# Patient Record
Sex: FEMALE | Race: BLACK OR AFRICAN AMERICAN | Employment: UNEMPLOYED | URBAN - METROPOLITAN AREA
[De-identification: names, ages, dates, MRNs, and addresses within clinical notes are randomized per-mention and may not be internally consistent; named-entity substitution may affect disease eponyms.]

---

## 2021-04-26 ENCOUNTER — HOSPITAL ENCOUNTER (EMERGENCY)
Age: 21
Discharge: HOME | End: 2021-04-26
Attending: EMERGENCY MEDICINE
Payer: MEDICARE

## 2021-04-26 VITALS
OXYGEN SATURATION: 96 % | TEMPERATURE: 97.7 F | SYSTOLIC BLOOD PRESSURE: 155 MMHG | DIASTOLIC BLOOD PRESSURE: 81 MMHG | RESPIRATION RATE: 18 BRPM | HEART RATE: 97 BPM

## 2021-04-26 DIAGNOSIS — H11.32 SUBCONJUNCTIVAL HEMORRHAGE OF LEFT EYE: ICD-10-CM

## 2021-04-26 DIAGNOSIS — S05.02XA ABRASION OF LEFT CORNEA, INITIAL ENCOUNTER: Primary | ICD-10-CM

## 2021-04-26 PROCEDURE — 6370000000 HC RX 637 (ALT 250 FOR IP): Performed by: GENERAL PRACTICE

## 2021-04-26 PROCEDURE — 99284 EMERGENCY DEPT VISIT MOD MDM: CPT

## 2021-04-26 PROCEDURE — 2500000003 HC RX 250 WO HCPCS

## 2021-04-26 RX ORDER — OFLOXACIN 3 MG/ML
2 SOLUTION/ DROPS OPHTHALMIC 4 TIMES DAILY
Qty: 1 BOTTLE | Refills: 0 | Status: SHIPPED | OUTPATIENT
Start: 2021-04-26 | End: 2021-05-03

## 2021-04-26 RX ORDER — PROPARACAINE HYDROCHLORIDE 5 MG/ML
1 SOLUTION/ DROPS OPHTHALMIC ONCE
Status: COMPLETED | OUTPATIENT
Start: 2021-04-26 | End: 2021-04-26

## 2021-04-26 RX ORDER — PROPARACAINE HYDROCHLORIDE 5 MG/ML
SOLUTION/ DROPS OPHTHALMIC
Status: COMPLETED
Start: 2021-04-26 | End: 2021-04-26

## 2021-04-26 RX ADMIN — PROPARACAINE HYDROCHLORIDE 1 DROP: 5 SOLUTION/ DROPS OPHTHALMIC at 10:59

## 2021-04-26 RX ADMIN — FLUORESCEIN SODIUM 1 MG: 1 STRIP OPHTHALMIC at 10:59

## 2021-04-26 ASSESSMENT — ENCOUNTER SYMPTOMS
VOMITING: 0
EYE ITCHING: 0
EYE REDNESS: 1
NAUSEA: 0
ABDOMINAL PAIN: 0
PHOTOPHOBIA: 0
EYE DISCHARGE: 0
EYE PAIN: 1
COUGH: 0

## 2021-04-26 ASSESSMENT — VISUAL ACUITY: OS: 20/40

## 2021-04-26 ASSESSMENT — PAIN DESCRIPTION - LOCATION: LOCATION: HEAD

## 2021-04-26 ASSESSMENT — PAIN DESCRIPTION - DESCRIPTORS: DESCRIPTORS: ACHING
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
## 2022-06-17 ENCOUNTER — APPOINTMENT (OUTPATIENT)
Dept: GENERAL RADIOLOGY | Age: 22
End: 2022-06-17
Payer: MEDICARE

## 2022-06-17 ENCOUNTER — HOSPITAL ENCOUNTER (EMERGENCY)
Age: 22
Discharge: HOME | End: 2022-06-17
Attending: EMERGENCY MEDICINE
Payer: MEDICARE

## 2022-06-17 VITALS
HEIGHT: 64 IN | DIASTOLIC BLOOD PRESSURE: 86 MMHG | WEIGHT: 200 LBS | RESPIRATION RATE: 20 BRPM | TEMPERATURE: 98.4 F | SYSTOLIC BLOOD PRESSURE: 124 MMHG | BODY MASS INDEX: 34.15 KG/M2 | HEART RATE: 78 BPM | OXYGEN SATURATION: 98 %

## 2022-06-17 DIAGNOSIS — S86.912A STRAIN OF LEFT KNEE, INITIAL ENCOUNTER: Primary | ICD-10-CM

## 2022-06-17 PROCEDURE — 99283 EMERGENCY DEPT VISIT LOW MDM: CPT

## 2022-06-17 PROCEDURE — 6370000000 HC RX 637 (ALT 250 FOR IP): Performed by: STUDENT IN AN ORGANIZED HEALTH CARE EDUCATION/TRAINING PROGRAM

## 2022-06-17 PROCEDURE — 73562 X-RAY EXAM OF KNEE 3: CPT

## 2022-06-17 RX ORDER — ACETAMINOPHEN 500 MG
1000 TABLET ORAL ONCE
Status: COMPLETED | OUTPATIENT
Start: 2022-06-17 | End: 2022-06-17

## 2022-06-17 RX ORDER — NAPROXEN 250 MG/1
250 TABLET ORAL ONCE
Status: COMPLETED | OUTPATIENT
Start: 2022-06-17 | End: 2022-06-17

## 2022-06-17 RX ORDER — LIDOCAINE 50 MG/G
1 PATCH TOPICAL DAILY
Qty: 30 PATCH | Refills: 0 | Status: SHIPPED | OUTPATIENT
Start: 2022-06-17

## 2022-06-17 RX ADMIN — ACETAMINOPHEN 1000 MG: 500 TABLET ORAL at 09:13

## 2022-06-17 RX ADMIN — NAPROXEN 250 MG: 250 TABLET ORAL at 09:13

## 2022-06-17 ASSESSMENT — PAIN - FUNCTIONAL ASSESSMENT: PAIN_FUNCTIONAL_ASSESSMENT: 0-10

## 2022-06-17 ASSESSMENT — PAIN DESCRIPTION - ORIENTATION: ORIENTATION: LEFT

## 2022-06-17 ASSESSMENT — ENCOUNTER SYMPTOMS: SHORTNESS OF BREATH: 0

## 2022-06-17 ASSESSMENT — PAIN SCALES - GENERAL: PAINLEVEL_OUTOF10: 10

## 2022-06-17 ASSESSMENT — PAIN DESCRIPTION - LOCATION: LOCATION: KNEE

## 2022-06-17 NOTE — Clinical Note
Barrington Lopez was seen and treated in our emergency department on 6/17/2022. She may return to work on 06/19/2022. If you have any questions or concerns, please don't hesitate to call.       Laith Topete, DO

## 2022-06-17 NOTE — ED PROVIDER NOTES
[facility]  Emergency Department  Faculty Attestation     I performed a history and physical examination of the patient and discussed management with the resident. I reviewed the residents note and agree with the documented findings and plan of care. Any areas of disagreement are noted on the chart. I was personally present for the key portions of any procedures. I have documented in the chart those procedures where I was not present during the key portions. I have reviewed the emergency nurses triage note. I agree with the chief complaint, past medical history, past surgical history, allergies, medications, social and family history as documented unless otherwise noted below. For Physician Assistant/ Nurse Practitioner cases/documentation I have personally evaluated this patient and have completed at least one if not all key elements of the E/M (history, physical exam, and MDM). Additional findings are as noted. Primary Care Physician:  Annetta Garcia MD    Screenings:  [unfilled]    CHIEF COMPLAINT       Chief Complaint   Patient presents with    Knee Pain     left knee pain       RECENT VITALS:   Temp: 98.4 °F (36.9 °C),  Heart Rate: 78, Resp: 20, BP: 124/86    LABS:  Labs Reviewed - No data to display    Radiology  XR KNEE LEFT (3 VIEWS)   Preliminary Result   No evidence of acute osseous abnormality. Attending Physician Additional  Notes    Has left knee pain without recent trauma. She has recurrent left knee issues. She does not recall a prior injury in specific. She has locking sensation where she cannot completely extend. She has clicking/crepitus sensation. No giving out sensation. No fever chills or sweats. No infectious symptoms. There is a concern for rheumatoid arthritis in the family and patient states that she has tingling in her hands but no true frozen joints in the morning. No improvement through the day.   She does have to stand while at work. On exam she is uncomfortable nontoxic afebrile vital signs normal.  No visible signs of arthritis in the hands. The left knee has discomfort with range of movement but no effusion warmth laxity or drawer sign. No bony tenderness. Impression is left knee pain likely from prior meniscus injury, consider arthritis. Plan is imaging, Ace wrap, ice/cool compresses, NSAIDs, follow-up with PCP and possibly sports medicine. Arlys Plants.  Zully Karimi MD, [facility]  Attending Emergency  Physician               Leeanne Kahn MD  06/17/22 0346

## 2022-06-17 NOTE — ED PROVIDER NOTES
[facility]  ED  Emergency Department Encounter  EmergencyMedicine Resident     Pt Balwinder Doshi  MRN: [id number]  [place] 2000  Date of evaluation: 6/17/22  PCP:  Piotr Prado MD    This patient was evaluated in the Emergency Department for symptoms described in the history of present illness. The patient was evaluated in the context of the global COVID-19 pandemic, which necessitated consideration that the patient might be at risk for infection with the SARS-CoV-2 virus that causes COVID-19. Institutional protocols and algorithms that pertain to the evaluation of patients at risk for COVID-19 are in a state of rapid change based on information released by regulatory bodies including the CDC and federal and state organizations. These policies and algorithms were followed during the patient's care in the ED. CHIEF COMPLAINT       Chief Complaint   Patient presents with    Knee Pain     left knee pain       HISTORY OF PRESENT ILLNESS  (Location/Symptom, Timing/Onset, Context/Setting, Quality, Duration, Modifying Factors, Severity.)      Steve Miller is a 25 y.o. female who presents with complaint of atraumatic left anterior knee pain. Patient is that she has had this intermittently for a while however last several days it has been bothering her. Has not had anything at home she is on her feet often and is due to exacerbate symptoms. PAST MEDICAL / SURGICAL / SOCIAL / FAMILY HISTORY      has a past medical history of Pilonidal cyst.       has a past surgical history that includes Pilonidal cyst excision (4/11/14).       Social History     Socioeconomic History    Marital status: Single     Spouse name: Not on file    Number of children: Not on file    Years of education: Not on file    Highest education level: Not on file   Occupational History    Not on file   Tobacco Use    Smoking status: Never Smoker    Smokeless tobacco: Not on file   Substance and Sexual Activity  Alcohol use: No    Drug use: No    Sexual activity: Never   Other Topics Concern    Not on file   Social History Narrative    Not on file     Social Determinants of Health     Financial Resource Strain:     Difficulty of Paying Living Expenses: Not on file   Food Insecurity:     Worried About Running Out of Food in the Last Year: Not on file    Jason of Food in the Last Year: Not on file   Transportation Needs:     Lack of Transportation (Medical): Not on file    Lack of Transportation (Non-Medical): Not on file   Physical Activity:     Days of Exercise per Week: Not on file    Minutes of Exercise per Session: Not on file   Stress:     Feeling of Stress : Not on file   Social Connections:     Frequency of Communication with Friends and Family: Not on file    Frequency of Social Gatherings with Friends and Family: Not on file    Attends Lutheran Services: Not on file    Active Member of [address] [place] [address] [employer] or Organizations: Not on file    Attends Club or Organization Meetings: Not on file    Marital Status: Not on file   Intimate Partner Violence:     Fear of Current or Ex-Partner: Not on file    Emotionally Abused: Not on file    Physically Abused: Not on file    Sexually Abused: Not on file   Housing Stability:     Unable to Pay for Housing in the Last Year: Not on file    Number of [place] in the Last Year: Not on file    Unstable Housing in the Last Year: Not on file       Family History   Problem Relation Age of Onset    Asthma Father        Allergies:  Patient has no known allergies. Home Medications:  Prior to Admission medications    Medication Sig Start Date End Date Taking?  Authorizing Provider   lidocaine (LIDODERM) 5 % Place 1 patch onto the skin daily 12 hours on, 12 hours off. 6/17/22  Yes Bruce Rome Cooks, DO   acetaminophen (TYLENOL) 325 MG tablet Take 2 tablets by mouth every 6 hours as needed for Pain or Fever 6/23/15   Mario Gustafson MD   ibuprofen (ADVIL;MOTRIN) 800 MG tablet Take 1 tablet by mouth every 6 hours as needed for Pain or Fever 6/23/15   Janel Roberts MD   Gauze Pads & Dressings 2\"X2\" PADS 1 each by Does not apply route 2 times daily for 30 days. 8/5/14 9/4/14  Axel Little DO   Gauze Pads & Dressings 2\"X2\" PADS 1 each by Does not apply route daily as needed for up to 30 days. 8/5/14 9/4/14  Axel Little, DO       REVIEW OF SYSTEMS    (2-9 systems for level 4, 10 or more for level 5)      Review of Systems   Constitutional: Negative for fever. Respiratory: Negative for shortness of breath. Cardiovascular: Negative for chest pain. Musculoskeletal: Positive for arthralgias. Skin: Negative for rash. Allergic/Immunologic: Negative for environmental allergies. Neurological: Negative for numbness. Hematological: Negative for adenopathy. Psychiatric/Behavioral: Negative for agitation. PHYSICAL EXAM   (up to 7 for level 4, 8 or more for level 5)      INITIAL VITALS:   /86   Pulse 78   Temp 98.4 °F (36.9 °C) (Oral)   Resp 20   Ht 5' 4\" (1.626 m)   Wt 200 lb (90.7 kg)   SpO2 98%   BMI 34.33 kg/m²     Physical Exam  Vitals and nursing note reviewed. Constitutional:       General: She is not in acute distress. Appearance: She is obese. She is not ill-appearing, toxic-appearing or diaphoretic. HENT:      Head: Normocephalic and atraumatic. Right Ear: External ear normal.      Nose: Nose normal.      Mouth/Throat:      Pharynx: Oropharynx is clear. Eyes:      Conjunctiva/sclera: Conjunctivae normal.   Cardiovascular:      Rate and Rhythm: Normal rate. Pulses: Normal pulses. Pulmonary:      Effort: Pulmonary effort is normal.   Abdominal:      Palpations: Abdomen is soft. Tenderness: There is no abdominal tenderness. Musculoskeletal:         General: No swelling, deformity or signs of injury. Cervical back: Normal range of motion. Right lower leg: No edema. Skin:     General: Skin is warm.       Capillary Refill: Capillary refill takes less than 2 seconds. Neurological:      Mental Status: She is alert and oriented to person, place, and time. Psychiatric:         Mood and Affect: Mood normal.         DIFFERENTIAL  DIAGNOSIS     PLAN (LABS / IMAGING / EKG):  Orders Placed This Encounter   Procedures    XR KNEE LEFT (3 VIEWS)    [facility]    Ice to affected area    Apply ace wrap       MEDICATIONS ORDERED:  Orders Placed This Encounter   Medications    naproxen (NAPROSYN) tablet 250 mg    acetaminophen (TYLENOL) tablet 1,000 mg    lidocaine (LIDODERM) 5 %     Sig: Place 1 patch onto the skin daily 12 hours on, 12 hours off. Dispense:  30 patch     Refill:  0       DDX: sprain/strain    DIAGNOSTIC RESULTS / EMERGENCY DEPARTMENT COURSE / MDM   LAB RESULTS:  No results found for this visit on 06/17/22. IMPRESSION: Patient is an alert oriented morbidly obese nontoxic 70-year-old female who is ambulating without difficulty or assistance complains of atraumatic left anterior knee pain of several days duration she has been struggling prior to come to the emergency department today. On examination she is resting comfortably in a seated position she has full range of motion of her knee no external signs of injury at that site. No calf tenderness popliteal pulses are strong. Negative anterior posterior drawer test negative Gopal's test.  Plan of x-ray imaging analgesia anticipate outpatient follow-up    RADIOLOGY:  XR KNEE LEFT (3 VIEWS)    Result Date: 6/17/2022  EXAMINATION: THREE XRAY VIEWS OF THE LEFT KNEE 6/17/2022 9:23 am COMPARISON: None. HISTORY: ORDERING SYSTEM PROVIDED HISTORY: pain eval bony injury TECHNOLOGIST PROVIDED HISTORY: pain eval bony injury Reason for Exam: knee pain x 2 days/ no known  trauma/ port. FINDINGS: There is minimal patella van. No other significant bone or joint abnormality is identified. No evidence of significant joint effusion.   No evidence of acute fracture or dislocation. No evidence of acute osseous abnormality. EKG      All EKG's are interpreted by the Emergency Department Physician who either signs or Co-signs this chart in the absence of a cardiologist.    EMERGENCY DEPARTMENT COURSE:  Seen and evaluated imaging unremarkable provided analgesia and discharged with outpatient followup    No notes of  Admission Criteria type on file. PROCEDURES:      CONSULTS:  None    CRITICAL CARE:      FINAL IMPRESSION      1. Strain of left knee, initial encounter          DISPOSITION / PLAN     DISPOSITION        PATIENT REFERRED TO:  Niesha Quiroz MD  [address] [facility] [address] [address] [facility]  [phone]    Call today  For follow-up and reevaluation in 1 to 2 days as needed. [facility]  [address] [id number] [phone]  Go to   If symptoms worsen, As needed    [facility]  [place] [address] [address] [facility]  [phone]  Schedule an appointment as soon as possible for a visit       [address] [address] [phone]  Call today  To establish a primary care physician and for follow-up.       DISCHARGE MEDICATIONS:  Discharge Medication List as of 6/17/2022  9:36 AM      START taking these medications    Details   lidocaine (LIDODERM) 5 % Place 1 patch onto the skin daily 12 hours on, 12 hours off., Disp-30 patch, R-0Print             Florentino Robert DO  Emergency Medicine Resident    (Please note that portions of thisnote were completed with a voice recognition program.  Efforts were made to edit the dictations but occasionally words are mis-transcribed.)        Florentino Robert DO  Resident  06/17/22 3759

## 2022-06-17 NOTE — ED NOTES
PT presents to the ED with C/O having left knee pain. Pt stated that the pain started yesterday. Pt states that this has happened before. Pt denies injury, thinks that she walks on it to much. Pt stated that pain is a 10 when she stands on it. Pt stated that she can bend and move knee it just hurts while moving. Pt's vitals are within normal limits. Will continue to monitor and reassess.       Socorro Cullen RN  06/17/22 1247